# Patient Record
Sex: FEMALE | URBAN - METROPOLITAN AREA
[De-identification: names, ages, dates, MRNs, and addresses within clinical notes are randomized per-mention and may not be internally consistent; named-entity substitution may affect disease eponyms.]

---

## 2022-04-26 ENCOUNTER — TRANSCRIBE ORDER (OUTPATIENT)
Dept: SCHEDULING | Age: 52
End: 2022-04-26

## 2022-04-26 DIAGNOSIS — Z12.31 SCREENING MAMMOGRAM FOR HIGH-RISK PATIENT: Primary | ICD-10-CM

## 2023-11-29 ENCOUNTER — HOSPITAL ENCOUNTER (OUTPATIENT)
Facility: HOSPITAL | Age: 53
Discharge: HOME | End: 2023-12-02
Payer: COMMERCIAL

## 2023-11-29 VITALS
HEART RATE: 70 BPM | WEIGHT: 183.3 LBS | OXYGEN SATURATION: 98 % | RESPIRATION RATE: 16 BRPM | SYSTOLIC BLOOD PRESSURE: 133 MMHG | BODY MASS INDEX: 29.46 KG/M2 | DIASTOLIC BLOOD PRESSURE: 81 MMHG | HEIGHT: 66 IN

## 2023-11-29 PROBLEM — M17.11 PRIMARY OSTEOARTHRITIS OF RIGHT KNEE: Status: ACTIVE | Noted: 2023-11-29

## 2023-11-29 LAB
ALBUMIN SERPL-MCNC: 4.2 G/DL (ref 3.5–5)
ALBUMIN/GLOB SERPL: 1.4 (ref 1.1–2.2)
ALP SERPL-CCNC: 77 U/L (ref 45–117)
ALT SERPL-CCNC: 29 U/L (ref 12–78)
ANION GAP SERPL CALC-SCNC: 5 MMOL/L (ref 5–15)
APPEARANCE UR: CLEAR
AST SERPL-CCNC: 17 U/L (ref 15–37)
BACTERIA URNS QL MICRO: ABNORMAL /HPF
BASOPHILS # BLD: 0.1 K/UL (ref 0–0.1)
BASOPHILS NFR BLD: 1 % (ref 0–1)
BILIRUB SERPL-MCNC: 0.5 MG/DL (ref 0.2–1)
BILIRUB UR QL: NEGATIVE
BUN SERPL-MCNC: 15 MG/DL (ref 6–20)
BUN/CREAT SERPL: 15 (ref 12–20)
CALCIUM SERPL-MCNC: 9.2 MG/DL (ref 8.5–10.1)
CHLORIDE SERPL-SCNC: 106 MMOL/L (ref 97–108)
CO2 SERPL-SCNC: 30 MMOL/L (ref 21–32)
COLOR UR: ABNORMAL
CREAT SERPL-MCNC: 0.97 MG/DL (ref 0.55–1.02)
DIFFERENTIAL METHOD BLD: NORMAL
EOSINOPHIL # BLD: 0.2 K/UL (ref 0–0.4)
EOSINOPHIL NFR BLD: 3 % (ref 0–7)
EPITH CASTS URNS QL MICRO: ABNORMAL /LPF
ERYTHROCYTE [DISTWIDTH] IN BLOOD BY AUTOMATED COUNT: 12.5 % (ref 11.5–14.5)
EST. AVERAGE GLUCOSE BLD GHB EST-MCNC: 103 MG/DL
GLOBULIN SER CALC-MCNC: 3.1 G/DL (ref 2–4)
GLUCOSE SERPL-MCNC: 113 MG/DL (ref 65–100)
GLUCOSE UR STRIP.AUTO-MCNC: NEGATIVE MG/DL
HBA1C MFR BLD: 5.2 % (ref 4–5.6)
HCT VFR BLD AUTO: 42.1 % (ref 35–47)
HGB BLD-MCNC: 14.2 G/DL (ref 11.5–16)
HGB UR QL STRIP: ABNORMAL
HYALINE CASTS URNS QL MICRO: ABNORMAL /LPF (ref 0–2)
IMM GRANULOCYTES # BLD AUTO: 0 K/UL (ref 0–0.04)
IMM GRANULOCYTES NFR BLD AUTO: 0 % (ref 0–0.5)
KETONES UR QL STRIP.AUTO: NEGATIVE MG/DL
LEUKOCYTE ESTERASE UR QL STRIP.AUTO: ABNORMAL
LYMPHOCYTES # BLD: 2 K/UL (ref 0.8–3.5)
LYMPHOCYTES NFR BLD: 33 % (ref 12–49)
MCH RBC QN AUTO: 30.9 PG (ref 26–34)
MCHC RBC AUTO-ENTMCNC: 33.7 G/DL (ref 30–36.5)
MCV RBC AUTO: 91.7 FL (ref 80–99)
MONOCYTES # BLD: 0.5 K/UL (ref 0–1)
MONOCYTES NFR BLD: 9 % (ref 5–13)
NEUTS SEG # BLD: 3.3 K/UL (ref 1.8–8)
NEUTS SEG NFR BLD: 54 % (ref 32–75)
NITRITE UR QL STRIP.AUTO: NEGATIVE
NRBC # BLD: 0 K/UL (ref 0–0.01)
NRBC BLD-RTO: 0 PER 100 WBC
PH UR STRIP: 5 (ref 5–8)
PLATELET # BLD AUTO: 224 K/UL (ref 150–400)
PMV BLD AUTO: 11.2 FL (ref 8.9–12.9)
POTASSIUM SERPL-SCNC: 3.7 MMOL/L (ref 3.5–5.1)
PROT SERPL-MCNC: 7.3 G/DL (ref 6.4–8.2)
PROT UR STRIP-MCNC: NEGATIVE MG/DL
RBC # BLD AUTO: 4.59 M/UL (ref 3.8–5.2)
RBC #/AREA URNS HPF: ABNORMAL /HPF (ref 0–5)
SODIUM SERPL-SCNC: 141 MMOL/L (ref 136–145)
SP GR UR REFRACTOMETRY: 1.01 (ref 1–1.03)
URINE CULTURE IF INDICATED: ABNORMAL
UROBILINOGEN UR QL STRIP.AUTO: 0.2 EU/DL (ref 0.2–1)
WBC # BLD AUTO: 6 K/UL (ref 3.6–11)
WBC URNS QL MICRO: ABNORMAL /HPF (ref 0–4)

## 2023-11-29 PROCEDURE — 85025 COMPLETE CBC W/AUTO DIFF WBC: CPT

## 2023-11-29 PROCEDURE — 80053 COMPREHEN METABOLIC PANEL: CPT

## 2023-11-29 PROCEDURE — 81001 URINALYSIS AUTO W/SCOPE: CPT

## 2023-11-29 PROCEDURE — 99202 OFFICE O/P NEW SF 15 MIN: CPT | Performed by: NURSE PRACTITIONER

## 2023-11-29 PROCEDURE — 93005 ELECTROCARDIOGRAM TRACING: CPT | Performed by: ORTHOPAEDIC SURGERY

## 2023-11-29 PROCEDURE — 83036 HEMOGLOBIN GLYCOSYLATED A1C: CPT

## 2023-11-29 PROCEDURE — 36415 COLL VENOUS BLD VENIPUNCTURE: CPT

## 2023-11-29 RX ORDER — ROSUVASTATIN CALCIUM 10 MG/1
10 TABLET, COATED ORAL
COMMUNITY

## 2023-11-29 RX ORDER — AMLODIPINE BESYLATE 5 MG/1
5 TABLET ORAL DAILY
COMMUNITY

## 2023-11-29 RX ORDER — IBUPROFEN 200 MG
200 TABLET ORAL EVERY 6 HOURS PRN
COMMUNITY

## 2023-11-29 RX ORDER — ACETAMINOPHEN 500 MG
500 TABLET ORAL EVERY 6 HOURS PRN
COMMUNITY

## 2023-11-29 ASSESSMENT — ENCOUNTER SYMPTOMS
BLOOD IN STOOL: 0
WHEEZING: 0
SHORTNESS OF BREATH: 0
COUGH: 0
TROUBLE SWALLOWING: 0
SORE THROAT: 0
VOMITING: 0
NAUSEA: 0
ABDOMINAL PAIN: 0

## 2023-11-29 NOTE — H&P (VIEW-ONLY)
April Osei was referred for evaluation by:Dr. Daysi Bruce for Pre- Op Evaluation. Please see encounter details and orders for consultative summary. Type of surgery : Right Patellofemoral Joint Replacement versus Total Knee Replacement  Surgery site : Right knee  Anesthesia type: General with Regional block  Date of procedure:  12/12/2023    This 46y.o. year old female presents with complaints of right anterior knee pain for the past 18 months. Denies any specific injury but knee has been feeling very stiff. Conservative measures including medication management, activity modification, physical therapy and injection therapy have been exhausted prior to the decision for surgery. Patient has discussed the risks, alternatives, and benefits of the surgery with surgeon and has elected to proceed with surgical intervention. Patient reports procedure is supposed to be FAST Track. PCP: Dr Chantal Garrido    Allergies: No Known Allergies  Latex allergy: no  Prior reactions to anesthesia:  None     Current Outpatient Medications   Medication Sig    amLODIPine (NORVASC) 5 MG tablet Take 1 tablet by mouth daily    rosuvastatin (CRESTOR) 10 MG tablet Take 1 tablet by mouth nightly    ibuprofen (ADVIL;MOTRIN) 200 MG tablet Take 1 tablet by mouth every 6 hours as needed for Pain    acetaminophen (TYLENOL) 500 MG tablet Take 1 tablet by mouth every 6 hours as needed for Pain     No current facility-administered medications for this encounter.      Past Medical History:   Diagnosis Date    Arthritis     HTN (hypertension)     Hyperlipidemia      Past Surgical History:   Procedure Laterality Date    COLONOSCOPY      FOOT SURGERY Right     excision of soft tissue mass    TUBAL LIGATION      WISDOM TOOTH EXTRACTION       Social History     Tobacco Use    Smoking status: Never    Smokeless tobacco: Never   Vaping Use    Vaping Use: Never used   Substance Use Topics    Alcohol use: Yes     Comment: 2-3 glasses of

## 2023-11-29 NOTE — H&P
(right knee). Skin:  Negative for rash and wound. Neurological:  Negative for light-headedness and headaches. Psychiatric/Behavioral:  Negative for dysphoric mood. The patient is not nervous/anxious. Physical Exam  Vitals and nursing note reviewed. Exam conducted with a chaperone present. Constitutional:       General: She is not in acute distress. Appearance: Normal appearance. HENT:      Head: Normocephalic and atraumatic. Right Ear: External ear normal.      Left Ear: External ear normal.      Nose: Nose normal.      Mouth/Throat:      Mouth: Mucous membranes are moist.      Pharynx: Oropharynx is clear. Eyes:      Extraocular Movements: Extraocular movements intact. Cardiovascular:      Rate and Rhythm: Normal rate and regular rhythm. Pulses: Normal pulses. Heart sounds: Normal heart sounds. Pulmonary:      Effort: Pulmonary effort is normal.      Breath sounds: Normal breath sounds. Abdominal:      General: Bowel sounds are normal.      Palpations: Abdomen is soft. Tenderness: There is no abdominal tenderness. Musculoskeletal:      Cervical back: Normal range of motion and neck supple. Right knee: Bony tenderness present. Decreased range of motion. Skin:     General: Skin is warm and dry. Findings: No rash. Neurological:      General: No focal deficit present. Mental Status: She is alert and oriented to person, place, and time. Psychiatric:         Mood and Affect: Mood normal.         Behavior: Behavior normal.          Assessment  Osteoarthritis of Right knee  Preoperative evaluation    Plan  Labs and EKG pending  Plan for Right Patellofemoral Joint Replacement versus Total Knee Replacement    The purpose of this visit is for preoperative history and physical and does not imply medical clearance for surgical procedure. Additional clearance from specialists may be required based on findings from this examination.     According to the

## 2023-11-30 LAB
BACTERIA SPEC CULT: NORMAL
BACTERIA SPEC CULT: NORMAL
EKG ATRIAL RATE: 67 BPM
EKG DIAGNOSIS: NORMAL
EKG P AXIS: 45 DEGREES
EKG P-R INTERVAL: 164 MS
EKG Q-T INTERVAL: 394 MS
EKG QRS DURATION: 82 MS
EKG QTC CALCULATION (BAZETT): 416 MS
EKG R AXIS: 38 DEGREES
EKG T AXIS: 45 DEGREES
EKG VENTRICULAR RATE: 67 BPM
SERVICE CMNT-IMP: NORMAL

## 2023-11-30 PROCEDURE — 93010 ELECTROCARDIOGRAM REPORT: CPT | Performed by: INTERNAL MEDICINE

## 2023-12-11 RX ORDER — ONDANSETRON 2 MG/ML
4 INJECTION INTRAMUSCULAR; INTRAVENOUS EVERY 6 HOURS PRN
Status: CANCELLED | OUTPATIENT
Start: 2023-12-11

## 2023-12-11 RX ORDER — MELOXICAM 7.5 MG/1
15 TABLET ORAL DAILY
Status: CANCELLED | OUTPATIENT
Start: 2023-12-11 | End: 2023-12-14

## 2023-12-11 RX ORDER — SODIUM CHLORIDE 0.9 % (FLUSH) 0.9 %
5-40 SYRINGE (ML) INJECTION PRN
Status: CANCELLED | OUTPATIENT
Start: 2023-12-11

## 2023-12-11 RX ORDER — OXYCODONE HYDROCHLORIDE 5 MG/1
10 TABLET ORAL EVERY 4 HOURS PRN
Status: CANCELLED | OUTPATIENT
Start: 2023-12-11

## 2023-12-11 RX ORDER — AMLODIPINE BESYLATE 5 MG/1
5 TABLET ORAL DAILY
Status: CANCELLED | OUTPATIENT
Start: 2023-12-11

## 2023-12-11 RX ORDER — PROMETHAZINE HYDROCHLORIDE 25 MG/1
12.5 TABLET ORAL EVERY 6 HOURS PRN
Status: CANCELLED | OUTPATIENT
Start: 2023-12-11

## 2023-12-11 RX ORDER — ROSUVASTATIN CALCIUM 10 MG/1
10 TABLET, COATED ORAL
Status: CANCELLED | OUTPATIENT
Start: 2023-12-11

## 2023-12-11 RX ORDER — ASPIRIN 81 MG/1
81 TABLET ORAL 2 TIMES DAILY
Status: CANCELLED | OUTPATIENT
Start: 2023-12-11

## 2023-12-11 RX ORDER — SODIUM CHLORIDE, SODIUM LACTATE, POTASSIUM CHLORIDE, CALCIUM CHLORIDE 600; 310; 30; 20 MG/100ML; MG/100ML; MG/100ML; MG/100ML
INJECTION, SOLUTION INTRAVENOUS CONTINUOUS
Status: CANCELLED | OUTPATIENT
Start: 2023-12-11

## 2023-12-11 RX ORDER — SODIUM CHLORIDE 9 MG/ML
INJECTION, SOLUTION INTRAVENOUS PRN
Status: CANCELLED | OUTPATIENT
Start: 2023-12-11

## 2023-12-11 RX ORDER — MAGNESIUM HYDROXIDE/ALUMINUM HYDROXICE/SIMETHICONE 120; 1200; 1200 MG/30ML; MG/30ML; MG/30ML
15 SUSPENSION ORAL EVERY 6 HOURS PRN
Status: CANCELLED | OUTPATIENT
Start: 2023-12-11

## 2023-12-11 RX ORDER — HYDROXYZINE HYDROCHLORIDE 10 MG/1
10 TABLET, FILM COATED ORAL EVERY 8 HOURS PRN
Status: CANCELLED | OUTPATIENT
Start: 2023-12-11

## 2023-12-11 RX ORDER — POLYETHYLENE GLYCOL 3350 17 G/17G
17 POWDER, FOR SOLUTION ORAL DAILY
Status: CANCELLED | OUTPATIENT
Start: 2023-12-11

## 2023-12-11 RX ORDER — OXYCODONE HYDROCHLORIDE 5 MG/1
5 TABLET ORAL EVERY 4 HOURS PRN
Status: CANCELLED | OUTPATIENT
Start: 2023-12-11

## 2023-12-11 RX ORDER — SODIUM CHLORIDE 0.9 % (FLUSH) 0.9 %
5-40 SYRINGE (ML) INJECTION EVERY 12 HOURS SCHEDULED
Status: CANCELLED | OUTPATIENT
Start: 2023-12-11

## 2023-12-11 RX ORDER — ACETAMINOPHEN 500 MG
1000 TABLET ORAL EVERY 8 HOURS SCHEDULED
Status: CANCELLED | OUTPATIENT
Start: 2023-12-11

## 2023-12-11 NOTE — DISCHARGE INSTRUCTIONS
Discharge Instructions after Uni Knee Replacement  Edilberto Norton MD  Office phone number: [phone]; extension to leave voicemail [phone] or [phone]  After hours (weekdays after 5PM or on weekends): Please call [phone], and follow the prompts to reach the on call provider      Activity:  You may put full weight on your operated leg unless otherwise instructed  Work on getting the knee all the way straight, starting to bend it, and lifting it straight up off the bed. Walk with a walker or two crutches for 2 weeks after surgery, then plan to go to a single point cane or single crutch for 2 weeks after that. The reason to walk with an assistive device is to avoid losing your balance and falling  Elevate your operated extremity (\"toes above nose\") throughout the day to decrease swelling and pain  Ice your operated knee multiple (3-4) times a day to decrease swelling and pain. Use a bag of ice or frozen vegetables inside a towel, placed onto the skin. This should be done for about 20-30 minutes at a time, with at least 20-30 minutes before the next icing session  Unless otherwise indicated, we will plan on starting physical therapy around 5-7 days after your surgery. You likely have been given a prescription for PT before the surgery or at the time of discharge from the hospital. If you did not, please contact our office. Make sure you are doing PT as prescribed and doing home exercises to accelerate your rehabilitation    Physical Therapy:  You should typically begin physical therapy within 5-7 days after discharge from the hospital.   For the first 5-7 days after surgery, the initial goal is to be able to get the knee all the way straight and to begin bending. We want the inflammation from the surgery to decrease initially  Physical therapy is critical to success after your operation. You should have a prescription for PT that details the goals that I have for the physical therapist after surgery.    You should

## 2023-12-12 ENCOUNTER — ANESTHESIA EVENT (OUTPATIENT)
Facility: HOSPITAL | Age: 53
End: 2023-12-12
Payer: COMMERCIAL

## 2023-12-12 ENCOUNTER — APPOINTMENT (OUTPATIENT)
Facility: HOSPITAL | Age: 53
End: 2023-12-12
Attending: ORTHOPAEDIC SURGERY
Payer: COMMERCIAL

## 2023-12-12 ENCOUNTER — ANESTHESIA (OUTPATIENT)
Facility: HOSPITAL | Age: 53
End: 2023-12-12
Payer: COMMERCIAL

## 2023-12-12 ENCOUNTER — HOSPITAL ENCOUNTER (OUTPATIENT)
Facility: HOSPITAL | Age: 53
Discharge: HOME | End: 2023-12-12
Attending: ORTHOPAEDIC SURGERY | Admitting: ORTHOPAEDIC SURGERY
Payer: COMMERCIAL

## 2023-12-12 VITALS
OXYGEN SATURATION: 90 % | DIASTOLIC BLOOD PRESSURE: 76 MMHG | TEMPERATURE: 98 F | WEIGHT: 182.98 LBS | HEART RATE: 84 BPM | SYSTOLIC BLOOD PRESSURE: 121 MMHG | BODY MASS INDEX: 29.53 KG/M2 | RESPIRATION RATE: 16 BRPM

## 2023-12-12 DIAGNOSIS — M17.11 PRIMARY OSTEOARTHRITIS OF RIGHT KNEE: Primary | ICD-10-CM

## 2023-12-12 PROBLEM — Z96.659 TOTAL KNEE REPLACEMENT STATUS, UNSPECIFIED LATERALITY: Status: ACTIVE | Noted: 2023-12-12

## 2023-12-12 PROCEDURE — 64447 NJX AA&/STRD FEMORAL NRV IMG: CPT | Performed by: ANESTHESIOLOGY

## 2023-12-12 PROCEDURE — 6370000000 HC RX 637 (ALT 250 FOR IP): Performed by: ORTHOPAEDIC SURGERY

## 2023-12-12 PROCEDURE — 3700000000 HC ANESTHESIA ATTENDED CARE: Performed by: ORTHOPAEDIC SURGERY

## 2023-12-12 PROCEDURE — 97161 PT EVAL LOW COMPLEX 20 MIN: CPT

## 2023-12-12 PROCEDURE — C1776 JOINT DEVICE (IMPLANTABLE): HCPCS | Performed by: ORTHOPAEDIC SURGERY

## 2023-12-12 PROCEDURE — 7100000011 HC PHASE II RECOVERY - ADDTL 15 MIN: Performed by: ORTHOPAEDIC SURGERY

## 2023-12-12 PROCEDURE — 3700000001 HC ADD 15 MINUTES (ANESTHESIA): Performed by: ORTHOPAEDIC SURGERY

## 2023-12-12 PROCEDURE — 97116 GAIT TRAINING THERAPY: CPT

## 2023-12-12 PROCEDURE — 6370000000 HC RX 637 (ALT 250 FOR IP): Performed by: ANESTHESIOLOGY

## 2023-12-12 PROCEDURE — 2709999900 HC NON-CHARGEABLE SUPPLY: Performed by: ORTHOPAEDIC SURGERY

## 2023-12-12 PROCEDURE — 2500000003 HC RX 250 WO HCPCS: Performed by: NURSE ANESTHETIST, CERTIFIED REGISTERED

## 2023-12-12 PROCEDURE — 73560 X-RAY EXAM OF KNEE 1 OR 2: CPT

## 2023-12-12 PROCEDURE — 7100000010 HC PHASE II RECOVERY - FIRST 15 MIN: Performed by: ORTHOPAEDIC SURGERY

## 2023-12-12 PROCEDURE — 6360000002 HC RX W HCPCS: Performed by: ANESTHESIOLOGY

## 2023-12-12 PROCEDURE — 6360000002 HC RX W HCPCS: Performed by: NURSE ANESTHETIST, CERTIFIED REGISTERED

## 2023-12-12 PROCEDURE — 2580000003 HC RX 258: Performed by: ORTHOPAEDIC SURGERY

## 2023-12-12 PROCEDURE — C1713 ANCHOR/SCREW BN/BN,TIS/BN: HCPCS | Performed by: ORTHOPAEDIC SURGERY

## 2023-12-12 PROCEDURE — 3600000005 HC SURGERY LEVEL 5 BASE: Performed by: ORTHOPAEDIC SURGERY

## 2023-12-12 PROCEDURE — APPNB30 APP NON BILLABLE TIME 0-30 MINS: Performed by: NURSE PRACTITIONER

## 2023-12-12 PROCEDURE — 2720000010 HC SURG SUPPLY STERILE: Performed by: ORTHOPAEDIC SURGERY

## 2023-12-12 PROCEDURE — 7100000001 HC PACU RECOVERY - ADDTL 15 MIN: Performed by: ORTHOPAEDIC SURGERY

## 2023-12-12 PROCEDURE — 3600000015 HC SURGERY LEVEL 5 ADDTL 15MIN: Performed by: ORTHOPAEDIC SURGERY

## 2023-12-12 PROCEDURE — 6360000002 HC RX W HCPCS: Performed by: ORTHOPAEDIC SURGERY

## 2023-12-12 PROCEDURE — 7100000000 HC PACU RECOVERY - FIRST 15 MIN: Performed by: ORTHOPAEDIC SURGERY

## 2023-12-12 DEVICE — IMPLANTABLE DEVICE: Type: IMPLANTABLE DEVICE | Site: KNEE | Status: FUNCTIONAL

## 2023-12-12 DEVICE — SCREW BNE L33MM CONSTRN CNDYL KNEE HEX HD STEM FOR LEG NXGN: Type: IMPLANTABLE DEVICE | Status: FUNCTIONAL

## 2023-12-12 DEVICE — CEMENT BNE 40GM FULL DOSE PMMA W/O ANTIBIO M VISC RADPQ: Type: IMPLANTABLE DEVICE | Site: KNEE | Status: FUNCTIONAL

## 2023-12-12 DEVICE — KNEE K1 TOT HEMI STD CEM IMPL CAPPED K1 ZIM: Type: IMPLANTABLE DEVICE | Status: FUNCTIONAL

## 2023-12-12 DEVICE — COMPONENT PAT DIA29MM THK8MM KNEE POLY CEM CONVENTIONAL: Type: IMPLANTABLE DEVICE | Site: KNEE | Status: FUNCTIONAL

## 2023-12-12 RX ORDER — OXYCODONE HYDROCHLORIDE 5 MG/1
5 TABLET ORAL EVERY 4 HOURS PRN
Qty: 30 TABLET | Refills: 0 | Status: SHIPPED | OUTPATIENT
Start: 2023-12-12 | End: 2023-12-19

## 2023-12-12 RX ORDER — CEFADROXIL 500 MG/1
500 CAPSULE ORAL 2 TIMES DAILY
Qty: 10 CAPSULE | Refills: 0 | Status: SHIPPED | OUTPATIENT
Start: 2023-12-12 | End: 2023-12-17

## 2023-12-12 RX ORDER — ONDANSETRON 4 MG/1
4 TABLET, FILM COATED ORAL DAILY PRN
Qty: 30 TABLET | Refills: 0 | Status: SHIPPED | OUTPATIENT
Start: 2023-12-12

## 2023-12-12 RX ORDER — ACETAMINOPHEN 325 MG/1
975 TABLET ORAL ONCE
Status: COMPLETED | OUTPATIENT
Start: 2023-12-12 | End: 2023-12-12

## 2023-12-12 RX ORDER — FENTANYL CITRATE 50 UG/ML
INJECTION, SOLUTION INTRAMUSCULAR; INTRAVENOUS PRN
Status: DISCONTINUED | OUTPATIENT
Start: 2023-12-12 | End: 2023-12-12 | Stop reason: SDUPTHER

## 2023-12-12 RX ORDER — DIPHENHYDRAMINE HYDROCHLORIDE 50 MG/ML
12.5 INJECTION INTRAMUSCULAR; INTRAVENOUS
Status: DISCONTINUED | OUTPATIENT
Start: 2023-12-12 | End: 2023-12-12 | Stop reason: HOSPADM

## 2023-12-12 RX ORDER — EPHEDRINE SULFATE/0.9% NACL/PF 50 MG/5 ML
SYRINGE (ML) INTRAVENOUS PRN
Status: DISCONTINUED | OUTPATIENT
Start: 2023-12-12 | End: 2023-12-12 | Stop reason: SDUPTHER

## 2023-12-12 RX ORDER — FENTANYL CITRATE 50 UG/ML
25 INJECTION, SOLUTION INTRAMUSCULAR; INTRAVENOUS EVERY 5 MIN PRN
Status: DISCONTINUED | OUTPATIENT
Start: 2023-12-12 | End: 2023-12-12 | Stop reason: HOSPADM

## 2023-12-12 RX ORDER — MELOXICAM 15 MG/1
15 TABLET ORAL DAILY
Qty: 30 TABLET | Refills: 0 | Status: SHIPPED | OUTPATIENT
Start: 2023-12-12 | End: 2024-01-11

## 2023-12-12 RX ORDER — CELECOXIB 100 MG/1
100 CAPSULE ORAL ONCE
Status: COMPLETED | OUTPATIENT
Start: 2023-12-12 | End: 2023-12-12

## 2023-12-12 RX ORDER — ACETAMINOPHEN 500 MG
500 TABLET ORAL 3 TIMES DAILY
Qty: 30 TABLET | Refills: 0 | Status: SHIPPED | OUTPATIENT
Start: 2023-12-12 | End: 2023-12-22

## 2023-12-12 RX ORDER — DEXAMETHASONE SODIUM PHOSPHATE 4 MG/ML
INJECTION, SOLUTION INTRA-ARTICULAR; INTRALESIONAL; INTRAMUSCULAR; INTRAVENOUS; SOFT TISSUE PRN
Status: DISCONTINUED | OUTPATIENT
Start: 2023-12-12 | End: 2023-12-12 | Stop reason: SDUPTHER

## 2023-12-12 RX ORDER — SODIUM CHLORIDE, SODIUM LACTATE, POTASSIUM CHLORIDE, CALCIUM CHLORIDE 600; 310; 30; 20 MG/100ML; MG/100ML; MG/100ML; MG/100ML
INJECTION, SOLUTION INTRAVENOUS CONTINUOUS
Status: DISCONTINUED | OUTPATIENT
Start: 2023-12-12 | End: 2023-12-12 | Stop reason: HOSPADM

## 2023-12-12 RX ORDER — POLYETHYLENE GLYCOL 3350 17 G/17G
17 POWDER, FOR SOLUTION ORAL DAILY
Qty: 7 EACH | Refills: 0 | Status: SHIPPED | OUTPATIENT
Start: 2023-12-12 | End: 2023-12-19

## 2023-12-12 RX ORDER — PHENYLEPHRINE HCL IN 0.9% NACL 0.4MG/10ML
SYRINGE (ML) INTRAVENOUS PRN
Status: DISCONTINUED | OUTPATIENT
Start: 2023-12-12 | End: 2023-12-12 | Stop reason: SDUPTHER

## 2023-12-12 RX ORDER — BUPIVACAINE HYDROCHLORIDE 2.5 MG/ML
INJECTION, SOLUTION EPIDURAL; INFILTRATION; INTRACAUDAL PRN
Status: DISCONTINUED | OUTPATIENT
Start: 2023-12-12 | End: 2023-12-12 | Stop reason: SDUPTHER

## 2023-12-12 RX ORDER — FENTANYL CITRATE 50 UG/ML
100 INJECTION, SOLUTION INTRAMUSCULAR; INTRAVENOUS
Status: DISCONTINUED | OUTPATIENT
Start: 2023-12-12 | End: 2023-12-12 | Stop reason: HOSPADM

## 2023-12-12 RX ORDER — ONDANSETRON 4 MG/1
4 TABLET, FILM COATED ORAL DAILY PRN
Qty: 30 TABLET | Refills: 0 | Status: SHIPPED | OUTPATIENT
Start: 2023-12-12 | End: 2023-12-12 | Stop reason: SDUPTHER

## 2023-12-12 RX ORDER — KETOROLAC TROMETHAMINE 30 MG/ML
15 INJECTION, SOLUTION INTRAMUSCULAR; INTRAVENOUS
Status: DISCONTINUED | OUTPATIENT
Start: 2023-12-12 | End: 2023-12-12 | Stop reason: HOSPADM

## 2023-12-12 RX ORDER — PROPOFOL 10 MG/ML
INJECTION, EMULSION INTRAVENOUS CONTINUOUS PRN
Status: DISCONTINUED | OUTPATIENT
Start: 2023-12-12 | End: 2023-12-12 | Stop reason: SDUPTHER

## 2023-12-12 RX ORDER — ONDANSETRON 2 MG/ML
4 INJECTION INTRAMUSCULAR; INTRAVENOUS
Status: COMPLETED | OUTPATIENT
Start: 2023-12-12 | End: 2023-12-12

## 2023-12-12 RX ORDER — ONDANSETRON 2 MG/ML
INJECTION INTRAMUSCULAR; INTRAVENOUS PRN
Status: DISCONTINUED | OUTPATIENT
Start: 2023-12-12 | End: 2023-12-12 | Stop reason: SDUPTHER

## 2023-12-12 RX ORDER — MIDAZOLAM HYDROCHLORIDE 1 MG/ML
INJECTION INTRAMUSCULAR; INTRAVENOUS PRN
Status: DISCONTINUED | OUTPATIENT
Start: 2023-12-12 | End: 2023-12-12 | Stop reason: SDUPTHER

## 2023-12-12 RX ORDER — LIDOCAINE HYDROCHLORIDE 10 MG/ML
1 INJECTION, SOLUTION EPIDURAL; INFILTRATION; INTRACAUDAL; PERINEURAL
Status: DISCONTINUED | OUTPATIENT
Start: 2023-12-12 | End: 2023-12-12 | Stop reason: HOSPADM

## 2023-12-12 RX ORDER — ASPIRIN 81 MG/1
81 TABLET, CHEWABLE ORAL 2 TIMES DAILY
Qty: 60 TABLET | Refills: 0 | Status: SHIPPED | OUTPATIENT
Start: 2023-12-12 | End: 2024-01-11

## 2023-12-12 RX ORDER — TRANEXAMIC ACID 100 MG/ML
INJECTION, SOLUTION INTRAVENOUS PRN
Status: DISCONTINUED | OUTPATIENT
Start: 2023-12-12 | End: 2023-12-12 | Stop reason: SDUPTHER

## 2023-12-12 RX ORDER — OXYCODONE HYDROCHLORIDE 5 MG/1
10 TABLET ORAL
Status: COMPLETED | OUTPATIENT
Start: 2023-12-12 | End: 2023-12-12

## 2023-12-12 RX ORDER — MIDAZOLAM HYDROCHLORIDE 2 MG/2ML
2 INJECTION, SOLUTION INTRAMUSCULAR; INTRAVENOUS
Status: DISCONTINUED | OUTPATIENT
Start: 2023-12-12 | End: 2023-12-12 | Stop reason: HOSPADM

## 2023-12-12 RX ORDER — OXYCODONE HCL 10 MG/1
20 TABLET, FILM COATED, EXTENDED RELEASE ORAL ONCE
Status: COMPLETED | OUTPATIENT
Start: 2023-12-12 | End: 2023-12-12

## 2023-12-12 RX ADMIN — CELECOXIB 100 MG: 100 CAPSULE ORAL at 06:57

## 2023-12-12 RX ADMIN — ACETAMINOPHEN 975 MG: 325 TABLET ORAL at 06:56

## 2023-12-12 RX ADMIN — FENTANYL CITRATE 100 MCG: 50 INJECTION, SOLUTION INTRAMUSCULAR; INTRAVENOUS at 07:41

## 2023-12-12 RX ADMIN — PROPOFOL 50 MG: 10 INJECTION, EMULSION INTRAVENOUS at 08:54

## 2023-12-12 RX ADMIN — WATER 2000 MG: 1 INJECTION INTRAMUSCULAR; INTRAVENOUS; SUBCUTANEOUS at 09:00

## 2023-12-12 RX ADMIN — MEPIVACAINE HYDROCHLORIDE 3.3 ML: 15 INJECTION, SOLUTION EPIDURAL; INFILTRATION at 08:34

## 2023-12-12 RX ADMIN — BUPIVACAINE HYDROCHLORIDE 20 ML: 2.5 INJECTION, SOLUTION EPIDURAL; INFILTRATION; INTRACAUDAL; PERINEURAL at 07:46

## 2023-12-12 RX ADMIN — OXYCODONE 10 MG: 5 TABLET ORAL at 11:54

## 2023-12-12 RX ADMIN — TRANEXAMIC ACID 1000 MG: 100 INJECTION, SOLUTION INTRAVENOUS at 09:00

## 2023-12-12 RX ADMIN — DEXAMETHASONE SODIUM PHOSPHATE 4 MG: 4 INJECTION, SOLUTION INTRAMUSCULAR; INTRAVENOUS at 09:30

## 2023-12-12 RX ADMIN — Medication 80 MCG: at 09:30

## 2023-12-12 RX ADMIN — Medication 80 MCG: at 09:22

## 2023-12-12 RX ADMIN — Medication 120 MCG: at 10:00

## 2023-12-12 RX ADMIN — ONDANSETRON HYDROCHLORIDE 4 MG: 2 SOLUTION INTRAMUSCULAR; INTRAVENOUS at 09:30

## 2023-12-12 RX ADMIN — OXYCODONE HYDROCHLORIDE 20 MG: 10 TABLET, FILM COATED, EXTENDED RELEASE ORAL at 06:56

## 2023-12-12 RX ADMIN — BUPIVACAINE HYDROCHLORIDE 20 ML: 2.5 INJECTION, SOLUTION EPIDURAL; INFILTRATION; INTRACAUDAL; PERINEURAL at 07:51

## 2023-12-12 RX ADMIN — Medication 15 MG: at 09:01

## 2023-12-12 RX ADMIN — MIDAZOLAM HYDROCHLORIDE 2 MG: 1 INJECTION, SOLUTION INTRAMUSCULAR; INTRAVENOUS at 07:41

## 2023-12-12 RX ADMIN — PROPOFOL 150 MCG/KG/MIN: 10 INJECTION, EMULSION INTRAVENOUS at 08:58

## 2023-12-12 RX ADMIN — ONDANSETRON 4 MG: 2 INJECTION INTRAMUSCULAR; INTRAVENOUS at 11:54

## 2023-12-12 RX ADMIN — Medication 80 MCG: at 10:04

## 2023-12-12 ASSESSMENT — PAIN DESCRIPTION - DESCRIPTORS: DESCRIPTORS: ACHING

## 2023-12-12 ASSESSMENT — PAIN SCALES - GENERAL
PAINLEVEL_OUTOF10: 3
PAINLEVEL_OUTOF10: 3
PAINLEVEL_OUTOF10: 0

## 2023-12-12 ASSESSMENT — PAIN DESCRIPTION - LOCATION: LOCATION: KNEE

## 2023-12-12 ASSESSMENT — PAIN DESCRIPTION - ORIENTATION: ORIENTATION: RIGHT

## 2023-12-12 ASSESSMENT — PAIN DESCRIPTION - ONSET: ONSET: PROGRESSIVE

## 2023-12-12 ASSESSMENT — PAIN DESCRIPTION - FREQUENCY: FREQUENCY: CONTINUOUS

## 2023-12-12 ASSESSMENT — PAIN DESCRIPTION - PAIN TYPE: TYPE: SURGICAL PAIN

## 2023-12-12 NOTE — INTERVAL H&P NOTE
Update History & Physical    The patient's History and Physical of November 29, was reviewed with the patient and I examined the patient. There was no change. The surgical site was confirmed by the patient and me. Plan: The risks, benefits, expected outcome, and alternative to the recommended procedure have been discussed with the patient. Patient understands and wants to proceed with the procedure.      Electronically signed by Christy Bains MD on 12/12/2023 at 6:40 AM

## 2023-12-12 NOTE — ANESTHESIA PROCEDURE NOTES
Spinal Block    Patient location during procedure: pre-op  End time: 12/12/2023 8:34 AM  Reason for block: post-op pain management, primary anesthetic and at surgeon's request  Staffing  Performed: resident/CRNA   Resident/CRNA: TIMO Clay CRNA  Performed by: TIMO Clay CRNA  Authorized by: Rhea Moulton, DO    Spinal Block  Patient position: sitting  Prep: DuraPrep  Patient monitoring: cardiac monitor, continuous pulse ox, continuous capnometry, frequent blood pressure checks and oxygen  Approach: midline  Location: L3/L4  Provider prep: mask and sterile gloves  Needle  Needle type: Pencan   Needle gauge: 25 G  Assessment  Swirl obtained: Yes  CSF: clear  Attempts: 1  Hemodynamics: stable  Preanesthetic Checklist  Completed: patient identified, IV checked, site marked, risks and benefits discussed, surgical/procedural consents, pre-op evaluation, timeout performed, anesthesia consent given, oxygen available and monitors applied/VS acknowledged

## 2023-12-12 NOTE — BRIEF OP NOTE
Brief Postoperative Note      Patient: April Osei  YOB: 1970  MRN: [id number]    Date of Procedure: 12/12/2023    Pre-Op Diagnosis Codes:     * Patellofemoral arthritis of right knee [M17.11]    Post-Op Diagnosis: Same       Procedure(s):  RIGHT PATELLOFEMORAL JOINT REPLACEMENT VERSUS TOTAL KNEE REPLACEMENT (GENERAL WITH REGIONAL BLOCK)    Surgeon(s):  Gail Tijerina MD    Assistant:  * No surgical staff found *    Anesthesia: General    Estimated Blood Loss (mL): 866KQ    Complications: None    Specimens:   * No specimens in log *    Implants:  * No implants in log *      Drains: * No LDAs found *    Findings: advanced arthritis of the patellofemoral compartment      Electronically signed by Gail Tijerina MD on 12/12/2023 at 6:40 AM

## 2023-12-12 NOTE — PERIOP NOTE
Pt has taken and tolerated full lunch tray, taking PO, denies pain, voided, dressed self with ease, spouse Jimmie Marina has returned and at bedside for instruction. Preparing for D/C.

## 2023-12-12 NOTE — OP NOTE
boom.  This was then tightened after confirming with an thien wing the appropriate resection. This was then fastened with an additional screw. The anterior femoral cut was then made with a saw. The intramedullary guide was then removed. We sized the trochlear component to a size 3. A guide was then placed in the appropriate position for the trochlear burring portion of the procedure. But guide was fastened using multiple screws. The high-speed bur was then used to complete the trochlear resection. The guide was then removed and the lug hole guide was placed into the appropriate position. This was again fastened. The lug holes were then drilled as well as the tail portion for the implant. The trial was then placed for the trochlear component. Attention was then turned to the patella. The backside of the patellar and quadriceps tendon were cleared. The patellar height was measured, and using a saw, the patellar cut was made giving a cut that would give equal medial-lateral and anterior-posterior widths. The patellar guide was placed and the drill holes made through the guide. The patellar trial was placed and tracking was checked and found to be appropriate. The lateral facet of the patella was denervated with electrocautery and any overhanging patellar osteophytes were removed. Trialing demonstrated appropriate tracking without clunk or maltracking. The knee was then irrigated, removing all bony debris. The bony surfaces were then dried with a sponge. Cement was mixed on the back table. The final components were opened. We then pressurized cement into the bone and impacted the trochlear component into place. Excess cement was cleared. We then pressurized the patellar bone with cement and inserted and clamped the final patellar component. Excess cement was then cleared. Pressure was applied to the trochlear component and to the patellar component with the clamp until the cement was fully cured.

## 2023-12-12 NOTE — ANESTHESIA PROCEDURE NOTES
Peripheral Block    Patient location during procedure: pre-op  Reason for block: procedure for pain, post-op pain management, primary anesthetic and at surgeon's request  Start time: 12/12/2023 7:41 AM  End time: 12/12/2023 7:51 AM  Staffing  Performed: anesthesiologist   Anesthesiologist: Anita Cortes DO  Performed by: Anita Cortes, DO  Authorized by: Anita Cortes DO    Preanesthetic Checklist  Completed: patient identified, IV checked, site marked, risks and benefits discussed, surgical/procedural consents, timeout performed, anesthesia consent given, oxygen available and monitors applied/VS acknowledged  Peripheral Block   Patient position: supine  Prep: ChloraPrep  Provider prep: mask and sterile gloves  Patient monitoring: cardiac monitor, continuous pulse ox, continuous capnometry, frequent blood pressure checks, IV access, oxygen and responsive to questions  Block type: Femoral and iPacks  Laterality: right  Injection technique: single-shot  Guidance: ultrasound guided    Needle   Needle type: Other   Needle gauge: 21 G  Needle localization: ultrasound guidance  Needle length: 10 cmOther needle type: STIMUPLEX  Assessment   Injection assessment: negative aspiration for heme, no paresthesia on injection, local visualized surrounding nerve on ultrasound and no intravascular symptoms  Hemodynamics: stable  Outcomes: patient tolerated procedure well    Additional Notes  IPACK block performed with landmark identification. Needle used was 4\" stimuplex 21g 20cc 0.25% bupivacaine injected intermittently with negative aspiration. [facility] RN witnessed timeout and block written on correct side.

## 2023-12-12 NOTE — PROGRESS NOTES
Short Forms. Physical Therapy Mar 2014, 94 (2) 379-391; DOI: 10.2522/ptj.25168100  2. Holly Wilson. Association of AM-PAC \"6-Clicks\" Basic Mobility and Daily Activity Scores With Discharge Destination. Phys Ther. 2021 4;101(4):duju887. doi: 10.1093/ptj/lqol563. PMID: 64663713. [facility],[address], Jose Alberto HOANG, Shlomo Hernandez, Isaura MONROE, Michael ERIC. Activity Measure for Post-Acute Care \"6-Clicks\" Basic Mobility Scores Predict Discharge Destination After Acute Care Hospitalization in Select Patient Groups: A Retrospective, Observational Study. Arch Rehabil Res Clin Transl. 2022;4(3):894516. doi: 10.1016/j.arrct. 8430.807284. PMID: 26497553; PMCID: [id number]. 4. Willow Donato, Margaret W, Milli SERRANO. AM-PAC Short Forms Manual 4.0. Revised 2020.                                                                                                                                                                                                                                Pain Ratin/10   Pain Intervention(s):   pain is at a level acceptable to the patient    Activity Tolerance:   Good    After treatment:   Patient left in no apparent distress sitting up in chair, Call bell within reach, and Caregiver / family present      COMMUNICATION/EDUCATION:   The patient's plan of care was discussed with: registered nurse         Thank you for this referral.  Irma Harvey, PT  Minutes: 25

## 2023-12-14 NOTE — ANESTHESIA POSTPROCEDURE EVALUATION
Department of Anesthesiology  Postprocedure Note    Patient: Nicole Graves  MRN: [id number]  YOB: 1970  Date of evaluation: 12/14/2023    Procedure Summary     Date: 12/12/23 Room / Location: [facility] OR [address] AMBULATORY OR    Anesthesia Start: 8964 Anesthesia Stop: 1039    Procedure: RIGHT PATELLOFEMORAL JOINT REPLACEMENT (Right: Knee) Diagnosis:       Patellofemoral arthritis of right knee      (Patellofemoral arthritis of right knee [M17.11])    Surgeons: Jose Elias Coyne MD Responsible Provider: Audie Slade DO    Anesthesia Type: Spinal, MAC ASA Status: 2          Anesthesia Type: Spinal, MAC    Yadi Phase I: Yadi Score: 10    Yadi Phase II: Yadi Score: 10    Anesthesia Post Evaluation    Comments: Patient discharged by PACU nursing without anesthesiologist evaluation. No notable events documented.

## (undated) DEVICE — GLOVE ORTHO 7 1/2   MSG9475

## (undated) DEVICE — SUTURE VCRL SZ 2-0 L36IN ABSRB UD L36MM CT-1 1/2 CIR J945H

## (undated) DEVICE — TOTAL JOINT-SFMC: Brand: MEDLINE INDUSTRIES, INC.

## (undated) DEVICE — SOLUTION IRRIG 3000ML 0.9% SOD CHL USP UROMATIC PLAS CONT

## (undated) DEVICE — 4-PORT MANIFOLD: Brand: NEPTUNE 2

## (undated) DEVICE — GLOVE ORTHO 8   MSG9480

## (undated) DEVICE — DRESSING ANTIMIC FOAM MEPILEX BORD POSTOP AG PROD SZ 4X12 IN

## (undated) DEVICE — SPONGE GZ W4XL4IN COT 12 PLY TYP VII WVN C FLD DSGN STERILE

## (undated) DEVICE — DRAPE,EXTREMITY,89X128,STERILE: Brand: MEDLINE

## (undated) DEVICE — YANKAUER,OPEN TIP,W/O VENT,STERILE: Brand: MEDLINE INDUSTRIES, INC.

## (undated) DEVICE — GLOVE SURG SZ 8 L12IN FNGR THK79MIL GRN LTX FREE

## (undated) DEVICE — SOLUTION IRRIG 1000ML STRL H2O USP PLAS POUR BTL

## (undated) DEVICE — SUTURE MNFLMNT TPR 36 MM 1/2 TA PN FLT NDLE PLDXNNE VLT

## (undated) DEVICE — DRESSING FOAM 4X8IN DISP POSTOP MEPILEX BORD AG

## (undated) DEVICE — SOLUTION SCRB 4% CHG RED ANTIMIC SKIN CLN PREOPERATIVE DISP

## (undated) DEVICE — DRAPE,REIN 53X77,STERILE: Brand: MEDLINE

## (undated) DEVICE — SYRINGE MED 30ML STD CLR PLAS LUERLOCK TIP N CTRL DISP

## (undated) DEVICE — FOAM BUMP, LARGE: Brand: MEDLINE INDUSTRIES, INC.

## (undated) DEVICE — CEMENT MIXING SYSTEM WITH FEMORAL BREAKWAY NOZZLE: Brand: REVOLUTION

## (undated) DEVICE — SUTURE VCRL + SZ 1-0 L36IN ABSRB UD CTX 1/2 CIR TAPR PNT VCP977H

## (undated) DEVICE — HOOD SURG T7

## (undated) DEVICE — ELECTRODE BLDE L4IN NONINSULATED EDGE

## (undated) DEVICE — SUTURE STRATAFIX SPRL SZ 1 L14IN ABSRB VLT L48CM CTX 1/2 SXPD2B405

## (undated) DEVICE — 450 ML BOTTLE OF 0.05% CHLORHEXIDINE GLUCONATE IN 99.95% STERILE WATER FOR IRRIGATION, USP AND APPLICATOR.: Brand: IRRISEPT ANTIMICROBIAL WOUND LAVAGE

## (undated) DEVICE — Device: Brand: JELCO

## (undated) DEVICE — PENCIL SMK EVAC ALL IN 1 DSGN ENH VISIBILITY IMPROVED AIR

## (undated) DEVICE — STRYKER PERFORMANCE SERIES SAGITTAL BLADE: Brand: STRYKER PERFORMANCE SERIES

## (undated) DEVICE — SUTURE STRATAFIX SPRL MCRYL + SZ 3-0 L24IN ABSRB UD PS-2 SXMP1B108

## (undated) DEVICE — Device